# Patient Record
(demographics unavailable — no encounter records)

---

## 2017-01-01 NOTE — HISTORY AND PHYSICAL REPORT
History of Present Illness


Date of examination: 17


Date of admission: 


17 12:34





Chief complaint: 


Smoot


History of present illness: 


38.1 week term male  delivered to a 25 yo  mother via . 

Maternal history of previous  deliveries at 27 and 33 weeks.  27 weeker 

was a demise.  





Smoot Documentation





- Maternal Info


Infant Delivery Method: Spontaneous Vaginal


Prenatal Events: Oligohydramnios


Maternal Blood Type: A (+) positive


HbsAg: Negative


HIV: Negative


RPR/VDRL: Non-reactive


Chlamydia: Negative


Gonorrhea: Negative


Herpes: Negative


Group Beta Strep: Negative


Rubella: Immune


Amniotic Membrane Rupture Date: 17


Amniotic Membrane Rupture Time: 12:08





- Birth


Birth information: 








Delivery Date                    17


Delivery Time                    13:24


1 Minute Apgar                   8


5 Minute Apgar                   9


Gestational Age                  38.1


Birthweight                      3.176 kg


Height                           19 in











Exam


 Vital Signs











Temp Pulse Resp


 


 98.0 F   150   80 H


 


 17 13:28  17 13:28  17 13:28








 











Temp Pulse Resp BP Pulse Ox


 


 98.0 F   150   80 H      


 


 17 13:28  17 13:28  17 13:28      














- General Appearance


General appearance: Positive: AGA, color consistent with genetic background, 

alert state appropriate, strong cry, flexed posture





- Constitutional


normal weight





- Skin


Positive: intact, other (Sinhala spots to buttocks)





- HEENT


Head: normocephalic, symmetrical movement


Fontanel: Positive: soft, flat


Eyes: Positive: symmetrical, other (STEFANIE eyes well for erythromycin ointment and 

eyelid edema)





- Nose


Nose: Positive: normal, patent, symmetrical, midline.  Negative: flaring


Nasal septum: Positive: normal position





- Ears


Auricles: normal





- Mouth


Mouth/tongue: symmetry of movement, palate intact, suck/swallow coordinated


Lips: normal


Oropharynx: normal





- Throat/Neck


Throat/Neck: normal position, no masses, gag reflex, symmetrical shoulders, 

clavicle intact, thyroid normal





- Chest/Lungs


Inspection: symmetric, normal expansion


Auscultation: clear and equal





- Cardiovascular


Femoral pulse/perfusion: equal bilaterally, capillary refill <3 sec., normal


Cardiovascular: regular rate, regular rhythm, S1 (normal), S2 (normal), murmur


Murmur quality: machinery


Murmur timing: systolic


Murmur location: MLSB


Transmission: none


Precordial activity: normal, no thrill





- Gastrointestinal


Positive: cylindrical, soft, normal BS, 3 vessel cord apparent.  Negative: 

palpable mass, distended, hernia





- Genitourinary


Genitalia: gender clearly delineated


Genitourinary: testes descended, testicles normal, normal urinary orifice, 

ureteral meatus at tip


Buttocks/rectum/anus: Positive: symmetrical, anus patent, normal tone.  Negative

: fissure, skin tags





- Musculoskeletal


Spine: Positive: flat and straight when prone


Musculoskeletal: Positive: normal, symmetrical, legs equal length.  Negative: 

extra digits, hip click





- Neurological


Positive: symmetrical movement, strength/tone in all extremities





- Reflexes


Reflexes: reflexes normal





Assessment and Plan


Routine  care. Monitor for any s/s of sepsis, jaundice, or hypoglycemia. 





- Patient Problems


(1) Single liveborn infant delivered vaginally


Current Visit: Yes   Status: Acute   





Plan





- Provider Discharge Summary





- Follow Up Plan


Follow up with: 


BEATRIS WELLINGTON MD [Primary Care Provider] - 7 Days

## 2017-01-01 NOTE — DISCHARGE SUMMARY
Providers





- Providers


Date of Admission: 


17 12:34





Date of discharge: 11/15/17


Attending physician: 


BEATRIS WELLINGTON MD








Hospitalization


Condition: Good


Disposition: DC-01 TO HOME OR SELFCARE





Core Measure Documentation





- Palliative Care


Palliative Care/ Comfort Measures: Not Applicable





- Core Measures


Any of the following diagnoses?: none





Exam





- Physical Exam


Narrative exam: 


Term male delivered via  with apgars of 8 and 9. Experienced breast feeding 

mother with two year old daughter.  Mother states that infant has been PO 

feeding well and started to do some nursing this morning. Exam performed in 

room with mother and WNL. NP discussed breast feeding expectations for newborns 

and gave mother encouragement.  Mother desires a DC at 24 hours and NP 

discussed with her criteria to met including follow up PCP tomorrow. Mother 

expressed understanding and all questions answered








- Constitutional


Vitals: 


 











Temp Pulse Resp BP Pulse Ox


 


 98.2 F   122   44       


 


 11/15/17 08:04  11/15/17 08:04  11/15/17 08:04      











General appearance: Present: no acute distress (Easily roused for exam), well-

nourished





- EENT


Eyes: Present: PERRL, EOM intact (Mild dependent edema of right eye)


ENT: hearing intact, clear oral mucosa





- Neck


Neck: Present: supple, normal ROM





- Respiratory


Respiratory effort: normal


Respiratory: bilateral: CTA





- Cardiovascular


Rhythm: regular


Heart Sounds: Present: S1 & S2.  Absent: rub, click





- Extremities


Extremities: pulses symmetrical, No edema


Peripheral Pulses: within normal limits





- Abdominal


General gastrointestinal: Present: soft, non-tender, non-distended, normal 

bowel sounds


Male genitourinary: Present: normal (Uncircumcised)





- Rectal


Rectal Exam: normal exam-external/orifice





- Integumentary


Integumentary: Present: clear, warm, dry, jaundice





- Musculoskeletal


Musculoskeletal: gait normal, strength equal bilaterally





- Neurologic


Neurologic: moves all extremities





Plan


Diet: other (Ad kim breast/PO feeds. Track I & O until follow up with PCP)


Additional Instructions: DC home with mother is 24 hour screens negative, TsB 

is less than 6 mg/dL and mother has follow up PCP appointment for